# Patient Record
Sex: MALE | Race: WHITE | NOT HISPANIC OR LATINO | URBAN - METROPOLITAN AREA
[De-identification: names, ages, dates, MRNs, and addresses within clinical notes are randomized per-mention and may not be internally consistent; named-entity substitution may affect disease eponyms.]

---

## 2017-09-11 ENCOUNTER — OFFICE VISIT (OUTPATIENT)
Dept: URGENT CARE | Facility: CLINIC | Age: 57
End: 2017-09-11
Payer: COMMERCIAL

## 2017-09-11 VITALS
OXYGEN SATURATION: 96 % | HEIGHT: 72 IN | WEIGHT: 238 LBS | SYSTOLIC BLOOD PRESSURE: 138 MMHG | DIASTOLIC BLOOD PRESSURE: 84 MMHG | HEART RATE: 72 BPM | BODY MASS INDEX: 32.23 KG/M2 | TEMPERATURE: 97.5 F

## 2017-09-11 DIAGNOSIS — M70.21 OLECRANON BURSITIS OF RIGHT ELBOW: ICD-10-CM

## 2017-09-11 PROCEDURE — 99203 OFFICE O/P NEW LOW 30 MIN: CPT | Mod: 25 | Performed by: PHYSICIAN ASSISTANT

## 2017-09-11 PROCEDURE — 20605 DRAIN/INJ JOINT/BURSA W/O US: CPT | Performed by: PHYSICIAN ASSISTANT

## 2017-09-11 ASSESSMENT — ENCOUNTER SYMPTOMS
CHILLS: 0
NUMBNESS: 0
SENSORY CHANGE: 0
FOCAL WEAKNESS: 0
NAUSEA: 0
FALLS: 0
WEAKNESS: 0
VOMITING: 0
FEVER: 0
DIARRHEA: 0
SHORTNESS OF BREATH: 0
ABDOMINAL PAIN: 0
DIAPHORESIS: 0
WHEEZING: 0

## 2017-09-11 NOTE — PROGRESS NOTES
Subjective:      Carlton Veras is a 56 y.o. male who presents with Gout (R elbow,no pain,very swollen x2weeks )            Other   This is a new problem. The current episode started 1 to 4 weeks ago (2wks). The problem has been waxing and waning. Pertinent negatives include no abdominal pain, chills, diaphoresis, fever, nausea, numbness, rash, vomiting or weakness. Associated symptoms comments: Denies pain  . Exacerbated by: resting on desk. Treatments tried: indomethacin, gout meds w/ no change. The treatment provided no relief.   Denies cut scrape or abrasion to or near elbow, denies fever/chills or feeling sick. Denies redness or warmth to elbow, c/o normal appearing skin w/ swelling over tip of elbow. 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 PMH of gout. Denies pain w/ this. Denies trauma. Denies fever/chills.     Review of Systems   Constitutional: Negative for chills, diaphoresis and fever.   Respiratory: Negative for shortness of breath and wheezing.    Gastrointestinal: Negative for abdominal pain, diarrhea, nausea and vomiting.   Musculoskeletal: Negative for falls and joint pain.        POS for swelling to tip of elbow   Skin: Negative for rash.   Neurological: Negative for sensory change, focal weakness, weakness and numbness.       PMH:  has no past medical history on file.  MEDS:   Current Outpatient Prescriptions:   •  indomethacin SR (INDOCIN SR) 75 MG CPCR, Take 1 Cap by mouth 2 times a day., Disp: 24 Cap, Rfl: 1  •  predniSONE (DELTASONE) 20 MG TABS, Take with food-take 1 tab bid for 5 days, Disp: 10 Tab, Rfl: 0  •  ibuprofen (MOTRIN) 800 MG TABS, Take 800 mg by mouth every 8 hours as needed.  , Disp: , Rfl:   ALLERGIES:   Allergies   Allergen Reactions   • Colchicine Diarrhea     High doses     SURGHX: No past surgical history on file.  SOCHX:  reports that he has never smoked. He has never used smokeless tobacco.  FH: Family history was  reviewed, no pertinent findings to report     Objective:     /84   Pulse 72   Temp 36.4 °C (97.5 °F)   Ht 1.829 m (6')   Wt 108 kg (238 lb)   SpO2 96%   BMI 32.28 kg/m²      Physical Exam   Constitutional: He is oriented to person, place, and time. He appears well-developed and well-nourished. No distress.   HENT:   Head: Normocephalic and atraumatic.   Right Ear: External ear normal.   Left Ear: External ear normal.   Nose: Nose normal.   Eyes: Conjunctivae are normal. Right eye exhibits no discharge. Left eye exhibits no discharge. No scleral icterus.   Neck: Neck supple.   Pulmonary/Chest: Effort normal. No respiratory distress.   Musculoskeletal:        Right elbow: He exhibits decreased range of motion ( 2/2 swelling) and swelling. He exhibits no effusion and no deformity. Tenderness found.   Neurological: He is alert and oriented to person, place, and time. Coordination normal.   Skin: Skin is warm and dry. He is not diaphoretic. No pallor.   Psychiatric: He has a normal mood and affect.   Nursing note and vitals reviewed.       right elbow aspiration:    Under verbally informed consent and sterile procedures patient's right elbow/olecranon is prepped w/ sterile solution, from an ulnar aspect an injection 2-3cc of lido is given subcutaneously; then an aspiration of ~30cc serosanguinous fluid is obtained.  Injection site covered w/ sterile bandage.  Pt tolerates injection well. Elbow wrapped w/ ACE wrap         Assessment/Plan:     1. Olecranon bursitis of right elbow  Recommend conservative care, rest, ice, elevation, work on gentle ROM exercises  Return to clinic with lack of resolution or progression of symptoms.  I stress the importance of trending continued resolution, padding elbow on firm surfaces, observe for onset fever/chills    Return to clinic with lack of resolution or progression of symptoms.

## 2017-10-19 ENCOUNTER — OFFICE VISIT (OUTPATIENT)
Dept: URGENT CARE | Facility: CLINIC | Age: 57
End: 2017-10-19
Payer: COMMERCIAL

## 2017-10-19 VITALS
WEIGHT: 232 LBS | BODY MASS INDEX: 31.42 KG/M2 | TEMPERATURE: 97.7 F | DIASTOLIC BLOOD PRESSURE: 92 MMHG | HEIGHT: 72 IN | SYSTOLIC BLOOD PRESSURE: 140 MMHG | HEART RATE: 77 BPM | OXYGEN SATURATION: 96 %

## 2017-10-19 DIAGNOSIS — M10.00 ACUTE IDIOPATHIC GOUT, UNSPECIFIED SITE: ICD-10-CM

## 2017-10-19 PROCEDURE — 99214 OFFICE O/P EST MOD 30 MIN: CPT | Performed by: NURSE PRACTITIONER

## 2017-10-19 RX ORDER — INDOMETHACIN 50 MG/1
50 CAPSULE ORAL 3 TIMES DAILY
Qty: 90 CAP | Refills: 1 | Status: SHIPPED | OUTPATIENT
Start: 2017-10-19

## 2017-10-19 NOTE — PROGRESS NOTES
Subjective:      Carlton Veras is a 56 y.o. male who presents with Medication Refill (Gout medication, apt with PCP 11-3. )            HPI New problem. 56 year old with gout flare in right ankle for past 3 days. He has been using previous rx for indocin with good relief but only has one left and needs refill. Denies fever, chills or other myalgia. Sees PCP in Nov.  Allergies, medications and history reviewed by me today      ROS  Please see HPI       Objective:     /92   Pulse 77   Temp 36.5 °C (97.7 °F)   Ht 1.829 m (6')   Wt 105.2 kg (232 lb)   SpO2 96%   BMI 31.46 kg/m²      Physical Exam   Constitutional: He is oriented to person, place, and time. He appears well-developed and well-nourished. No distress.   Cardiovascular: Normal rate, regular rhythm and normal heart sounds.    No murmur heard.  Pulmonary/Chest: Effort normal and breath sounds normal. No respiratory distress.   Musculoskeletal:        Right ankle: He exhibits swelling. He exhibits normal range of motion.   Neurological: He is alert and oriented to person, place, and time.   Skin: Skin is warm and dry. There is erythema.   Psychiatric: He has a normal mood and affect. His behavior is normal. Thought content normal.   Nursing note and vitals reviewed.              Assessment/Plan:     1. Acute idiopathic gout, unspecified site  indomethacin (INDOCIN) 50 MG Cap     Differential diagnosis, natural history, supportive care, and indications for immediate follow-up discussed at length.

## 2020-11-04 ENCOUNTER — HOSPITAL ENCOUNTER (OUTPATIENT)
Dept: LAB | Facility: MEDICAL CENTER | Age: 60
End: 2020-11-04
Attending: FAMILY MEDICINE
Payer: COMMERCIAL

## 2020-11-04 PROCEDURE — U0003 INFECTIOUS AGENT DETECTION BY NUCLEIC ACID (DNA OR RNA); SEVERE ACUTE RESPIRATORY SYNDROME CORONAVIRUS 2 (SARS-COV-2) (CORONAVIRUS DISEASE [COVID-19]), AMPLIFIED PROBE TECHNIQUE, MAKING USE OF HIGH THROUGHPUT TECHNOLOGIES AS DESCRIBED BY CMS-2020-01-R: HCPCS

## 2020-11-04 PROCEDURE — C9803 HOPD COVID-19 SPEC COLLECT: HCPCS

## 2020-11-05 LAB
COVID ORDER STATUS COVID19: NORMAL
SARS-COV-2 RNA RESP QL NAA+PROBE: NOTDETECTED
SPECIMEN SOURCE: NORMAL

## 2020-12-02 ENCOUNTER — HOSPITAL ENCOUNTER (OUTPATIENT)
Dept: LAB | Facility: MEDICAL CENTER | Age: 60
End: 2020-12-02
Attending: FAMILY MEDICINE
Payer: COMMERCIAL

## 2020-12-02 PROCEDURE — U0003 INFECTIOUS AGENT DETECTION BY NUCLEIC ACID (DNA OR RNA); SEVERE ACUTE RESPIRATORY SYNDROME CORONAVIRUS 2 (SARS-COV-2) (CORONAVIRUS DISEASE [COVID-19]), AMPLIFIED PROBE TECHNIQUE, MAKING USE OF HIGH THROUGHPUT TECHNOLOGIES AS DESCRIBED BY CMS-2020-01-R: HCPCS

## 2020-12-03 LAB — COVID ORDER STATUS COVID19: NORMAL

## 2020-12-04 LAB
SARS-COV-2 RNA RESP QL NAA+PROBE: NOTDETECTED
SPECIMEN SOURCE: NORMAL

## 2024-12-30 ENCOUNTER — HOSPITAL ENCOUNTER (OUTPATIENT)
Dept: RADIOLOGY | Facility: MEDICAL CENTER | Age: 64
End: 2024-12-30
Attending: INTERNAL MEDICINE
Payer: MEDICARE

## 2024-12-30 DIAGNOSIS — J40 CATARRHAL BRONCHITIS: ICD-10-CM

## 2024-12-30 PROCEDURE — 71046 X-RAY EXAM CHEST 2 VIEWS: CPT
